# Patient Record
Sex: FEMALE | Race: WHITE | NOT HISPANIC OR LATINO | URBAN - METROPOLITAN AREA
[De-identification: names, ages, dates, MRNs, and addresses within clinical notes are randomized per-mention and may not be internally consistent; named-entity substitution may affect disease eponyms.]

---

## 2017-03-15 ENCOUNTER — ALLSCRIPTS OFFICE VISIT (OUTPATIENT)
Dept: OTHER | Facility: OTHER | Age: 62
End: 2017-03-15

## 2017-03-15 DIAGNOSIS — E55.9 VITAMIN D DEFICIENCY: ICD-10-CM

## 2017-03-15 DIAGNOSIS — F41.8 OTHER SPECIFIED ANXIETY DISORDERS: ICD-10-CM

## 2017-03-15 DIAGNOSIS — I10 ESSENTIAL (PRIMARY) HYPERTENSION: ICD-10-CM

## 2017-03-15 DIAGNOSIS — Z12.31 ENCOUNTER FOR SCREENING MAMMOGRAM FOR MALIGNANT NEOPLASM OF BREAST: ICD-10-CM

## 2017-03-23 ENCOUNTER — LAB CONVERSION - ENCOUNTER (OUTPATIENT)
Dept: OTHER | Facility: OTHER | Age: 62
End: 2017-03-23

## 2017-03-23 LAB
25(OH)D3 SERPL-MCNC: 23 NG/ML (ref 30–100)
A/G RATIO (HISTORICAL): 1.7 (CALC) (ref 1–2.5)
ALBUMIN SERPL BCP-MCNC: 4.3 G/DL (ref 3.6–5.1)
ALP SERPL-CCNC: 89 U/L (ref 33–130)
ALT SERPL W P-5'-P-CCNC: 14 U/L (ref 6–29)
AST SERPL W P-5'-P-CCNC: 17 U/L (ref 10–35)
BASOPHILS # BLD AUTO: 0.5 %
BASOPHILS # BLD AUTO: 21 CELLS/UL (ref 0–200)
BILIRUB SERPL-MCNC: 0.6 MG/DL (ref 0.2–1.2)
BILIRUBIN DIRECT (HISTORICAL): 0.1 MG/DL
BUN SERPL-MCNC: 15 MG/DL (ref 7–25)
BUN/CREA RATIO (HISTORICAL): NORMAL (CALC) (ref 6–22)
CALCIUM SERPL-MCNC: 9.9 MG/DL (ref 8.6–10.4)
CHLORIDE SERPL-SCNC: 107 MMOL/L (ref 98–110)
CHOLEST SERPL-MCNC: 230 MG/DL (ref 125–200)
CHOLEST/HDLC SERPL: 3.2 (CALC)
CO2 SERPL-SCNC: 28 MMOL/L (ref 20–31)
CREAT SERPL-MCNC: 0.78 MG/DL (ref 0.5–0.99)
DEPRECATED RDW RBC AUTO: 14.3 % (ref 11–15)
EGFR AFRICAN AMERICAN (HISTORICAL): 94 ML/MIN/1.73M2
EGFR-AMERICAN CALC (HISTORICAL): 81 ML/MIN/1.73M2
EOSINOPHIL # BLD AUTO: 1.1 %
EOSINOPHIL # BLD AUTO: 46 CELLS/UL (ref 15–500)
GAMMA GLOBULIN (HISTORICAL): 2.6 G/DL (CALC) (ref 1.9–3.7)
GLUCOSE (HISTORICAL): 85 MG/DL (ref 65–99)
HCT VFR BLD AUTO: 43 % (ref 35–45)
HDLC SERPL-MCNC: 73 MG/DL
HGB BLD-MCNC: 14 G/DL (ref 11.7–15.5)
INDIRECT BILIRUBIN (HISTORICAL): 0.5 MG/DL (CALC) (ref 0.2–1.2)
LDL CHOLESTEROL (HISTORICAL): 135 MG/DL (CALC)
LYMPHOCYTES # BLD AUTO: 1394 CELLS/UL (ref 850–3900)
LYMPHOCYTES # BLD AUTO: 33.2 %
MCH RBC QN AUTO: 30.6 PG (ref 27–33)
MCHC RBC AUTO-ENTMCNC: 32.5 G/DL (ref 32–36)
MCV RBC AUTO: 94.1 FL (ref 80–100)
MONOCYTES # BLD AUTO: 265 CELLS/UL (ref 200–950)
MONOCYTES (HISTORICAL): 6.3 %
NEUTROPHILS # BLD AUTO: 2474 CELLS/UL (ref 1500–7800)
NEUTROPHILS # BLD AUTO: 58.9 %
NON-HDL-CHOL (CHOL-HDL) (HISTORICAL): 157 MG/DL (CALC)
PLATELET # BLD AUTO: 269 THOUSAND/UL (ref 140–400)
PMV BLD AUTO: 9 FL (ref 7.5–12.5)
POTASSIUM SERPL-SCNC: 4.7 MMOL/L (ref 3.5–5.3)
RBC # BLD AUTO: 4.57 MILLION/UL (ref 3.8–5.1)
SODIUM SERPL-SCNC: 143 MMOL/L (ref 135–146)
TOTAL PROTEIN (HISTORICAL): 6.9 G/DL (ref 6.1–8.1)
TRIGL SERPL-MCNC: 110 MG/DL
TSH SERPL DL<=0.05 MIU/L-ACNC: 2.19 MIU/L (ref 0.4–4.5)
WBC # BLD AUTO: 4.2 THOUSAND/UL (ref 3.8–10.8)

## 2017-04-06 ENCOUNTER — ALLSCRIPTS OFFICE VISIT (OUTPATIENT)
Dept: OTHER | Facility: OTHER | Age: 62
End: 2017-04-06

## 2017-04-13 ENCOUNTER — GENERIC CONVERSION - ENCOUNTER (OUTPATIENT)
Dept: OTHER | Facility: OTHER | Age: 62
End: 2017-04-13

## 2017-10-01 DIAGNOSIS — I10 ESSENTIAL (PRIMARY) HYPERTENSION: ICD-10-CM

## 2017-10-01 DIAGNOSIS — E55.9 VITAMIN D DEFICIENCY: ICD-10-CM

## 2017-10-01 DIAGNOSIS — Z12.31 ENCOUNTER FOR SCREENING MAMMOGRAM FOR MALIGNANT NEOPLASM OF BREAST: ICD-10-CM

## 2017-10-01 DIAGNOSIS — E78.5 HYPERLIPIDEMIA: ICD-10-CM

## 2017-10-23 ENCOUNTER — GENERIC CONVERSION - ENCOUNTER (OUTPATIENT)
Dept: OTHER | Facility: OTHER | Age: 62
End: 2017-10-23

## 2017-10-30 ENCOUNTER — LAB CONVERSION - ENCOUNTER (OUTPATIENT)
Dept: OTHER | Facility: OTHER | Age: 62
End: 2017-10-30

## 2017-10-30 LAB
25(OH)D3 SERPL-MCNC: 39 NG/ML (ref 30–100)
A/G RATIO (HISTORICAL): 1.7 (CALC) (ref 1–2.5)
ALBUMIN SERPL BCP-MCNC: 4.2 G/DL (ref 3.6–5.1)
ALP SERPL-CCNC: 83 U/L (ref 33–130)
ALT SERPL W P-5'-P-CCNC: 13 U/L (ref 6–29)
AST SERPL W P-5'-P-CCNC: 19 U/L (ref 10–35)
BASOPHILS # BLD AUTO: 0.7 %
BASOPHILS # BLD AUTO: 30 CELLS/UL (ref 0–200)
BILIRUB SERPL-MCNC: 0.7 MG/DL (ref 0.2–1.2)
BILIRUBIN DIRECT (HISTORICAL): 0.1 MG/DL
BUN SERPL-MCNC: 16 MG/DL (ref 7–25)
BUN/CREA RATIO (HISTORICAL): NORMAL (CALC) (ref 6–22)
CALCIUM SERPL-MCNC: 9.4 MG/DL (ref 8.6–10.4)
CHLORIDE SERPL-SCNC: 104 MMOL/L (ref 98–110)
CO2 SERPL-SCNC: 28 MMOL/L (ref 20–31)
CREAT SERPL-MCNC: 0.74 MG/DL (ref 0.5–0.99)
DEPRECATED RDW RBC AUTO: 12.7 % (ref 11–15)
EGFR AFRICAN AMERICAN (HISTORICAL): 100 ML/MIN/1.73M2
EGFR-AMERICAN CALC (HISTORICAL): 86 ML/MIN/1.73M2
EOSINOPHIL # BLD AUTO: 0.9 %
EOSINOPHIL # BLD AUTO: 39 CELLS/UL (ref 15–500)
GAMMA GLOBULIN (HISTORICAL): 2.5 G/DL (CALC) (ref 1.9–3.7)
GLUCOSE (HISTORICAL): 82 MG/DL (ref 65–99)
HCT VFR BLD AUTO: 40.8 % (ref 35–45)
HGB BLD-MCNC: 14.1 G/DL (ref 11.7–15.5)
INDIRECT BILIRUBIN (HISTORICAL): 0.6 MG/DL (CALC) (ref 0.2–1.2)
LYMPHOCYTES # BLD AUTO: 1256 CELLS/UL (ref 850–3900)
LYMPHOCYTES # BLD AUTO: 29.2 %
MCH RBC QN AUTO: 31.2 PG (ref 27–33)
MCHC RBC AUTO-ENTMCNC: 34.6 G/DL (ref 32–36)
MCV RBC AUTO: 90.3 FL (ref 80–100)
MONOCYTES # BLD AUTO: 353 CELLS/UL (ref 200–950)
MONOCYTES (HISTORICAL): 8.2 %
NEUTROPHILS # BLD AUTO: 2623 CELLS/UL (ref 1500–7800)
NEUTROPHILS # BLD AUTO: 61 %
PLATELET # BLD AUTO: 266 THOUSAND/UL (ref 140–400)
PMV BLD AUTO: 10.5 FL (ref 7.5–12.5)
POTASSIUM SERPL-SCNC: 4.4 MMOL/L (ref 3.5–5.3)
RBC # BLD AUTO: 4.52 MILLION/UL (ref 3.8–5.1)
SODIUM SERPL-SCNC: 140 MMOL/L (ref 135–146)
TOTAL PROTEIN (HISTORICAL): 6.7 G/DL (ref 6.1–8.1)
WBC # BLD AUTO: 4.3 THOUSAND/UL (ref 3.8–10.8)

## 2017-11-02 ENCOUNTER — GENERIC CONVERSION - ENCOUNTER (OUTPATIENT)
Dept: OTHER | Facility: OTHER | Age: 62
End: 2017-11-02

## 2017-11-02 ENCOUNTER — ALLSCRIPTS OFFICE VISIT (OUTPATIENT)
Dept: OTHER | Facility: OTHER | Age: 62
End: 2017-11-02

## 2017-11-02 DIAGNOSIS — R07.9 CHEST PAIN: ICD-10-CM

## 2017-11-10 ENCOUNTER — GENERIC CONVERSION - ENCOUNTER (OUTPATIENT)
Dept: OTHER | Facility: OTHER | Age: 62
End: 2017-11-10

## 2017-11-20 ENCOUNTER — TRANSCRIBE ORDERS (OUTPATIENT)
Dept: BONE DENSITY | Facility: CLINIC | Age: 62
End: 2017-11-20

## 2017-11-20 ENCOUNTER — HOSPITAL ENCOUNTER (OUTPATIENT)
Dept: MAMMOGRAPHY | Facility: CLINIC | Age: 62
Discharge: HOME | End: 2017-11-20
Payer: COMMERCIAL

## 2017-11-20 DIAGNOSIS — Z12.31 ENCOUNTER FOR SCREENING MAMMOGRAM FOR MALIGNANT NEOPLASM OF BREAST: ICD-10-CM

## 2017-11-20 DIAGNOSIS — Z12.39 SCREENING BREAST EXAMINATION: Primary | ICD-10-CM

## 2017-11-20 PROCEDURE — G0202 SCR MAMMO BI INCL CAD: HCPCS

## 2017-11-20 PROCEDURE — 77063 BREAST TOMOSYNTHESIS BI: CPT

## 2017-12-05 ENCOUNTER — ALLSCRIPTS OFFICE VISIT (OUTPATIENT)
Dept: OTHER | Facility: OTHER | Age: 62
End: 2017-12-05

## 2017-12-05 DIAGNOSIS — M85.80 OTHER SPECIFIED DISORDERS OF BONE DENSITY AND STRUCTURE, UNSPECIFIED SITE (CODE): ICD-10-CM

## 2017-12-05 PROCEDURE — 87624 HPV HI-RISK TYP POOLED RSLT: CPT | Performed by: OBSTETRICS & GYNECOLOGY

## 2017-12-05 PROCEDURE — G0145 SCR C/V CYTO,THINLAYER,RESCR: HCPCS | Performed by: OBSTETRICS & GYNECOLOGY

## 2017-12-06 ENCOUNTER — LAB REQUISITION (OUTPATIENT)
Dept: LAB | Facility: HOSPITAL | Age: 62
End: 2017-12-06
Payer: COMMERCIAL

## 2017-12-06 DIAGNOSIS — Z01.411 ENCOUNTER FOR GYNECOLOGICAL EXAMINATION WITH ABNORMAL FINDING: ICD-10-CM

## 2017-12-06 NOTE — PROGRESS NOTES
Assessment    1  Encounter for gynecological examination with abnormal finding (V72 31) (Z01 411)    Plan  Benign colon polyp    · 1 Monica Anderson MD, [facility] Co-Management  *  Status: Active  Requestedfor: 53YGE6421   Ordered;Benign colon polyp; Ordered By: Rasta Michaud Performed:  Due: 85HYZ4135  Care Summary provided  : Yes  Dense breasts, Encounter for screening mammogram for malignant neoplasm of breast    · MAMMO SCREENING BILATERAL W 3D & CAD; Status:Hold For - Scheduling; Requestedfor:30Nov2018; Perform:[facility]; LJQ:[id number];NSDTKSS;QYJMEBV, Encounter for screening mammogram for malignant neoplasm of breast; Ordered By:Christopher Leblanc;  Encounter for gynecological examination with abnormal finding    · Follow-up visit in 1 year Evaluation and Treatment  Follow-up  Status: Hold For -Scheduling  Requested for: 12SJR4216   Ordered; For: Encounter for gynecological examination with abnormal finding; Ordered By: Rasta Michaud Performed:  Due: 25MSY8778   · Drink plenty of fluids ; Status:Complete;   Done: 79VKX7999 01:58PM   Ordered;for gynecological examination with abnormal finding; Ordered By:Christopher Leblanc;   · Vitamins can help you get daily requirements that your diet may not be giving you  ;Status:Complete;   Done: 92BJO4959 01:58PM   Ordered;for gynecological examination with abnormal finding; Ordered By:Christopher Leblanc;   · We encourage all of our patients to exercise regularly  30 minutes of exercise or physicalactivity five or more days a week is recommended for children and adults  ;Status:Complete;   Done: 97MGU4733 01:58PM   Ordered;for gynecological examination with abnormal finding; Ordered By:Christopher Leblanc;   · We recommend that you follow these steps to lower your risk of osteoporosis  ;Status:Complete;   Done: 58TBE1826 01:58PM   Ordered;for gynecological examination with abnormal finding; Ordered By:Deana Carmelita Muhammad;  Osteopenia    · * DXA BONE DENSITY SPINE HIP AND PELVIS; Status:Hold For -Scheduling,Retrospective By Protocol Authorization; Requested for:[id number];    Perform:[facility]; [phone]; Last Updated By:Tracy, Melodye Severin; 12/5/2017 1:30:31 PM;Ordered;Ordered Avi Salinas, Carmelita Muhammad;    Discussion/Summary  healthy adult female HPV and Pap Co-testing Done Today Breast cancer screening: mammogram is needed every year  Colorectal cancer screening: colonoscopy has been ordered  The patient has the current Goals: Healthy lifestyle  The patent has the current Barriers: Motivation  Patient is able to Self-Care  PATIENT EDUCATION RECORD She was given the following educational materials:  age appropriate care guide  The treatment plan was reviewed with the patient/guardian  The patient/guardian understands and agrees with the treatment plan     Self Referrals: No      Chief Complaint  Patient here for an exam today  History of Present Illness  GYN HM, Adult Female [facility]: The patient is being seen for a gynecology evaluation  The last health maintenance visit was 2 year(s) ago  Social History: Household members include lives with sister  General Health: The patient's health since the last visit is described as good  Lifestyle:  She exercises regularly  -- She does not use tobacco -- She consumes alcohol  Reproductive health: the patient is postmenopausal--   pregnancy history: G 1P 0  Screening: Cervical cancer screening includes a pap smear performed 9/24/14, neg-- and-- human papilloma virus screening performed 7/13/12, neg  Breast cancer screening includes a mammogram performed 11/30/17  BR2  Colorectal cancer screening includes a colonoscopy performed 10/14/14  Metabolic screening includes uncertain timing of her last DEXA        Review of Systems  urinary loss of control, but-- no pelvic pain,-- no pelvic pressure,-- no vaginal pain,-- no vaginal discharge,-- no vaginal itching,-- no vaginal lump or mass,-- no vaginal odor,-- no nonmenstrual bleeding-- and-- no dysuria  Constitutional: No fever, no chills, feels well, no tiredness, no recent weight gain or loss  ENT: no ear ache, no loss of hearing, no nosebleeds or nasal discharge, no sore throat or hoarseness  Cardiovascular: no complaints of slow or fast heart rate, no chest pain, no palpitations, no leg claudication or lower extremity edema  Respiratory: no complaints of shortness of breath, no wheezing, no dyspnea on exertion, no orthopnea or PND  Breasts: no complaints of breast pain, breast lump or nipple discharge  Gastrointestinal: no complaints of abdominal pain, no constipation, no nausea or diarrhea, no vomiting, no bloody stools  Genitourinary: no complaints of dysuria, no incontinence, no pelvic pain, no dysmenorrhea, no vaginal discharge or abnormal vaginal bleeding  Musculoskeletal: no complaints of arthralgia, no myalgia, no joint swelling or stiffness, no limb pain or swelling  Integumentary: no complaints of skin rash or lesion, no itching or dry skin, no skin wounds  Neurological: no complaints of headache, no confusion, no numbness or tingling, no dizziness or fainting  Active Problems    1  Benign colon polyp (211 3) (K63 5)   2  Chest pain (786 50) (R07 9)   3  Dense breasts (793 82) (R92 2)   4  Depression with anxiety (300 4) (F41 8)   5  Encounter for screening mammogram for malignant neoplasm of breast (V76 12) (Z12 31)   6  Essential hypertension (401 9) (I10)   7  Hyperlipidemia (272 4) (E78 5)   8  Insomnia (780 52) (G47 00)   9  Screening for colon cancer (V76 51) (Z12 11)   10  Screening mammogram, encounter for (V76 12) (Z12 31)   11  Seborrheic keratosis (702 19) (L82 1)   12  Skin lesion (709 9) (L98 9)   13   Vitamin D deficiency (268 9) (E55 9)    Surgical History     · History of Bunion Correct Osteotomy Flood Double-Stem Lesser MTP Impl   · History of Corneal LASIK   · History of Exploratory Laparotomy   · History of Knee Surgery   · History of Shoulder Surgery    Family History  Mother    · Family history of Cancer of unknown origin   · Family history of CVA (cerebrovascular accident due to intracerebral hemorrhage)   · Family history of coronary artery disease (V17 3) (Z82 49)   · Family history of dementia (V17 2) (Z81 8)  Father    · Family history of dementia (V17 2) (Z81 8)    Social History     · Consumes alcohol (V49 89) (Z78 9)   · Never a smoker   · Retired    Current Meds   1  Restasis 0 05 % Ophthalmic Emulsion; INSTILL 1 DROP IN [facility] EYE TWICE DAILY; Therapy: 41WUL7835 to Recorded   2  Venlafaxine HCl  MG Oral Capsule Extended Release 24 Hour; take 1 capsule by mouth daily; Therapy: 39JRH4937 to (Urbano Smith)  Requested for: 27UFQ3439; Last Rx:05Nov2017 Ordered    Allergies  1  Keflex TABS    Vitals   Recorded: 93ITY3482 37:99PF   Systolic 383   Diastolic 70   Height 5 ft    Weight 140 lb    BMI Calculated 27 34   BSA Calculated 1 6   LMP        Physical Exam   Constitutional  General appearance: No acute distress, well appearing and well nourished  Genitourinary  External genitalia: Normal and no lesions appreciated  Vagina: Normal, no lesions or dryness appreciated  Urethra: Normal    Urethral meatus: Normal    Bladder: Normal, soft, non-tender and no prolapse or masses appreciated  Cervix: Normal, no palpable masses  Uterus: Normal, non-tender, not enlarged, and no palpable masses  Adnexa/parametria: Normal, non-tender and no fullness or masses appreciated  Chest  Breasts: Normal and no dimpling or skin changes noted  Abdomen  Abdomen: Normal, non-tender, and no organomegaly noted  Future Appointments    Date/Time Provider Specialty Site   03/13/2018 01:00 PM YESSENIA Hawk  Internal Medicine [facility]   01/25/2018 01:00 PM YESSENIA Villeda   Dermatology [facility] Signatures   Electronically signed by : Marysol Nj MD; Dec  5 2017  1:59PM EST                       (Author)

## 2017-12-12 LAB — HPV RRNA GENITAL QL NAA+PROBE: NORMAL

## 2017-12-13 LAB
LAB AP GYN PRIMARY INTERPRETATION: NORMAL
Lab: NORMAL

## 2018-01-13 VITALS
HEART RATE: 97 BPM | WEIGHT: 136.38 LBS | HEIGHT: 62 IN | SYSTOLIC BLOOD PRESSURE: 138 MMHG | BODY MASS INDEX: 25.1 KG/M2 | DIASTOLIC BLOOD PRESSURE: 92 MMHG

## 2018-01-13 VITALS
BODY MASS INDEX: 24.56 KG/M2 | SYSTOLIC BLOOD PRESSURE: 124 MMHG | WEIGHT: 133.5 LBS | RESPIRATION RATE: 12 BRPM | HEIGHT: 62 IN | HEART RATE: 68 BPM | DIASTOLIC BLOOD PRESSURE: 80 MMHG

## 2018-01-22 VITALS
HEIGHT: 62 IN | WEIGHT: 139.38 LBS | BODY MASS INDEX: 25.65 KG/M2 | RESPIRATION RATE: 14 BRPM | DIASTOLIC BLOOD PRESSURE: 76 MMHG | HEART RATE: 80 BPM | SYSTOLIC BLOOD PRESSURE: 110 MMHG

## 2018-01-23 VITALS
WEIGHT: 140 LBS | BODY MASS INDEX: 27.48 KG/M2 | HEIGHT: 60 IN | SYSTOLIC BLOOD PRESSURE: 116 MMHG | DIASTOLIC BLOOD PRESSURE: 70 MMHG

## 2018-01-25 ENCOUNTER — OFFICE VISIT (OUTPATIENT)
Dept: DERMATOLOGY | Facility: CLINIC | Age: 63
End: 2018-01-25
Payer: COMMERCIAL

## 2018-01-25 DIAGNOSIS — L29.9 PRURITUS OF SCALP: Primary | ICD-10-CM

## 2018-01-25 DIAGNOSIS — Z13.89 SCREENING FOR SKIN CONDITION: ICD-10-CM

## 2018-01-25 DIAGNOSIS — L72.11 PILAR CYST: ICD-10-CM

## 2018-01-25 DIAGNOSIS — L82.1 SEBORRHEIC KERATOSIS: ICD-10-CM

## 2018-01-25 PROCEDURE — 99203 OFFICE O/P NEW LOW 30 MIN: CPT | Performed by: DERMATOLOGY

## 2018-01-25 RX ORDER — CYCLOSPORINE 0.5 MG/ML
1 EMULSION OPHTHALMIC 2 TIMES DAILY
COMMUNITY
Start: 2017-03-15

## 2018-01-25 RX ORDER — CETIRIZINE HYDROCHLORIDE 10 MG/1
10 TABLET ORAL DAILY
Qty: 30 TABLET | Refills: 0 | Status: SHIPPED | OUTPATIENT
Start: 2018-01-25

## 2018-01-25 RX ORDER — VENLAFAXINE HYDROCHLORIDE 150 MG/1
150 CAPSULE, EXTENDED RELEASE ORAL DAILY
COMMUNITY
Start: 2017-03-15 | End: 2018-05-25 | Stop reason: SDUPTHER

## 2018-01-25 NOTE — PROGRESS NOTES
[address] [facility] OF [address] [facility] DERMATOLOGY  [address] [facility] [address]     MRN: [id number] : 1955  Encounter: [id number]  Patient Information: Lisy Karimi  Chief complaint:  New growth under left eyebrow and overall skin check    History of present illness:  27-year-old female presents because of concerns regarding a growth that she noted a left eyebrow recently she feels it is getting larger and is annoying her also concerned regarding potential skin cancer with numerous growths on her skin  Patient also complaining of itching on her scalp she has tried dandruff shampoo without any improvement  No past medical history on file  No past surgical history on file  Social History   History   Alcohol use Not on file     History   Drug use: Unknown     History   Smoking Status    Not on file   Smokeless Tobacco    Not on file     No family history on file  Meds/Allergies   Allergies   Allergen Reactions    Keflex [Cephalexin] Hives       Meds:  Prior to Admission medications    Medication Sig Start Date End Date Taking?  Authorizing Provider   cycloSPORINE (RESTASIS) 0 05 % ophthalmic emulsion Apply 1 drop to eye 2 (two) times a day 3/15/17  Yes Historical Provider, MD   venlafaxine (EFFEXOR-XR) 150 mg 24 hr capsule Take 150 mg by mouth daily 3/15/17  Yes Historical Provider, MD   cetirizine (ZyrTEC) 10 mg tablet Take 1 tablet by mouth daily 18   Armani Trammell MD       Subjective:     Review of Systems:    General: negative for - chills, fatigue, fever,  weight gain or weight loss  Psychological: negative for - anxiety, behavioral disorder, concentration difficulties, decreased libido, depression, irritability, memory difficulties, mood swings, sleep disturbances or suicidal ideation  ENT: negative for - hearing difficulties , nasal congestion, nasal discharge, oral lesions, sinus pain, sneezing, sore throat  Allergy and Immunology: negative for - hives, insect bite sensitivity,  Hematological and Lymphatic: negative for - bleeding problems, blood clots,bruising, swollen lymph nodes  Endocrine: negative for - hair pattern changes, hot flashes, malaise/lethargy, mood swings, palpitations, polydipsia/polyuria, skin changes, temperature intolerance or unexpected weight change  Respiratory: negative for - cough, hemoptysis, orthopnea, shortness of breath, or wheezing  Cardiovascular: negative for - chest pain, dyspnea on exertion, edema,  Gastrointestinal: negative for - abdominal pain, nausea/vomiting  Genito-Urinary: negative for - dysuria, incontinence, irregular/heavy menses or urinary frequency/urgency  Musculoskeletal: negative for - gait disturbance, joint pain, joint stiffness, joint swelling, muscle pain, muscular weakness  Dermatological:  As in HPI  Neurological: negative for confusion, dizziness, headaches, impaired coordination/balance, memory loss, numbness/tingling, seizures, speech problems, tremors or weakness       Objective: There were no vitals taken for this visit  Physical Exam:    General Appearance:    Alert, cooperative, no distress   Head:    Normocephalic, without obvious abnormality, atraumatic   Lymphatics:    No lymphadenopathy noted      Abdomen:   No hepatosplenomegaly   Skin:   A full skin exam was performed including scalp, head scalp, eyes, ears, nose, lips, neck, chest, axilla, abdomen, back, buttocks, bilateral upper extremities, bilateral lower extremities, hands, feet, fingers, toes, fingernails, and toenails 3mm dermal papule L eyebrow normal keratotic papules with greasy stuck on appearance no definitive rash noted on the scalp no scaling or erythema negative dermatographia  Nothing else of concern noted on complete exam     Assessment:     1  Pruritus of scalp  cetirizine (ZyrTEC) 10 mg tablet   2  Seborrheic keratosis     3  Pilar cyst     4  Screening for skin condition           Plan:   1    Pruritus of the scalp appears to be more related to the dry cold weather we suggested use of moisturizes mild soaps avoidance of irritants and a trial of Zyrtec  2  Seborrheic keratosis patient reassured these are normal growths we acquire with age no treatment needed  3   Pilar cyst because the patient's concern and irritation will plan on excision in the near future  4   Nothing else of concern noted on complete exam follow-up probably yearly    Makayla Mayo MD  1/25/2018,1:48 PM    Portions of the record may have been created with voice recognition software   Occasional wrong word or "sound a like" substitutions may have occurred due to the inherent limitations of voice recognition software   Read the chart carefully and recognize, using context, where substitutions have occurred

## 2018-03-01 DIAGNOSIS — E78.5 HYPERLIPIDEMIA: ICD-10-CM

## 2018-03-08 LAB
ALBUMIN SERPL-MCNC: 4.5 G/DL (ref 3.6–5.1)
ALBUMIN/GLOB SERPL: 1.7 (CALC) (ref 1–2.5)
ALP SERPL-CCNC: 96 U/L (ref 33–130)
ALT SERPL-CCNC: 13 U/L (ref 6–29)
AST SERPL-CCNC: 17 U/L (ref 10–35)
BASOPHILS # BLD AUTO: 29 CELLS/UL (ref 0–200)
BASOPHILS NFR BLD AUTO: 0.6 %
BILIRUB SERPL-MCNC: 0.4 MG/DL (ref 0.2–1.2)
BUN SERPL-MCNC: 17 MG/DL (ref 7–25)
BUN/CREAT SERPL: NORMAL (CALC) (ref 6–22)
CALCIUM SERPL-MCNC: 9.4 MG/DL (ref 8.6–10.4)
CHLORIDE SERPL-SCNC: 104 MMOL/L (ref 98–110)
CHOLEST SERPL-MCNC: 229 MG/DL
CHOLEST/HDLC SERPL: 3.3 (CALC)
CO2 SERPL-SCNC: 27 MMOL/L (ref 20–31)
CREAT SERPL-MCNC: 0.84 MG/DL (ref 0.5–0.99)
EOSINOPHIL # BLD AUTO: 49 CELLS/UL (ref 15–500)
EOSINOPHIL NFR BLD AUTO: 1 %
ERYTHROCYTE [DISTWIDTH] IN BLOOD BY AUTOMATED COUNT: 12.5 % (ref 11–15)
GLOBULIN SER CALC-MCNC: 2.6 G/DL (CALC) (ref 1.9–3.7)
GLUCOSE SERPL-MCNC: 90 MG/DL (ref 65–99)
HCT VFR BLD AUTO: 42.1 % (ref 35–45)
HDLC SERPL-MCNC: 70 MG/DL
HGB BLD-MCNC: 14.3 G/DL (ref 11.7–15.5)
LDLC SERPL CALC-MCNC: 142 MG/DL (CALC)
LYMPHOCYTES # BLD AUTO: 1485 CELLS/UL (ref 850–3900)
LYMPHOCYTES NFR BLD AUTO: 30.3 %
MCH RBC QN AUTO: 31.2 PG (ref 27–33)
MCHC RBC AUTO-ENTMCNC: 34 G/DL (ref 32–36)
MCV RBC AUTO: 91.7 FL (ref 80–100)
MONOCYTES # BLD AUTO: 431 CELLS/UL (ref 200–950)
MONOCYTES NFR BLD AUTO: 8.8 %
NEUTROPHILS # BLD AUTO: 2906 CELLS/UL (ref 1500–7800)
NEUTROPHILS NFR BLD AUTO: 59.3 %
NONHDLC SERPL-MCNC: 159 MG/DL (CALC)
PLATELET # BLD AUTO: 352 THOUSAND/UL (ref 140–400)
PMV BLD REES-ECKER: 10.5 FL (ref 7.5–12.5)
POTASSIUM SERPL-SCNC: 4.5 MMOL/L (ref 3.5–5.3)
PROT SERPL-MCNC: 7.1 G/DL (ref 6.1–8.1)
RBC # BLD AUTO: 4.59 MILLION/UL (ref 3.8–5.1)
SL AMB EGFR AFRICAN AMERICAN: 86 ML/MIN/1.73M2
SL AMB EGFR NON AFRICAN AMERICAN: 74 ML/MIN/1.73M2
SODIUM SERPL-SCNC: 141 MMOL/L (ref 135–146)
TRIGL SERPL-MCNC: 71 MG/DL
TSH SERPL-ACNC: 1.4 MIU/L (ref 0.4–4.5)
WBC # BLD AUTO: 4.9 THOUSAND/UL (ref 3.8–10.8)

## 2018-03-13 ENCOUNTER — OFFICE VISIT (OUTPATIENT)
Dept: INTERNAL MEDICINE CLINIC | Facility: CLINIC | Age: 63
End: 2018-03-13
Payer: COMMERCIAL

## 2018-03-13 VITALS
BODY MASS INDEX: 26.13 KG/M2 | RESPIRATION RATE: 12 BRPM | SYSTOLIC BLOOD PRESSURE: 124 MMHG | HEIGHT: 61 IN | WEIGHT: 138.4 LBS | HEART RATE: 72 BPM | DIASTOLIC BLOOD PRESSURE: 80 MMHG

## 2018-03-13 DIAGNOSIS — M85.80 OSTEOPENIA, UNSPECIFIED LOCATION: ICD-10-CM

## 2018-03-13 DIAGNOSIS — E55.9 VITAMIN D DEFICIENCY: Primary | ICD-10-CM

## 2018-03-13 DIAGNOSIS — E78.2 MIXED HYPERLIPIDEMIA: ICD-10-CM

## 2018-03-13 DIAGNOSIS — K63.5 BENIGN COLON POLYP: ICD-10-CM

## 2018-03-13 DIAGNOSIS — F41.8 DEPRESSION WITH ANXIETY: ICD-10-CM

## 2018-03-13 PROBLEM — E78.5 HYPERLIPIDEMIA: Status: ACTIVE | Noted: 2017-04-06

## 2018-03-13 PROBLEM — I10 ESSENTIAL HYPERTENSION: Status: ACTIVE | Noted: 2017-03-15

## 2018-03-13 PROCEDURE — 99214 OFFICE O/P EST MOD 30 MIN: CPT | Performed by: INTERNAL MEDICINE

## 2018-03-13 RX ORDER — MULTIVIT-MIN/IRON/FOLIC ACID/K 18-600-40
CAPSULE ORAL
COMMUNITY

## 2018-03-13 NOTE — PROGRESS NOTES
Assessment/Plan:    No problem-specific Assessment & Plan notes found for this encounter  Diagnoses and all orders for this visit:    Vitamin D deficiency  -     Vitamin D 25 hydroxy; Future    Mixed hyperlipidemia  -     CBC; Future  -     Comprehensive metabolic panel; Future  -     Lipid panel; Future    Depression with anxiety    Osteopenia, unspecified location    Benign colon polyp    Other orders  -     Cholecalciferol (VITAMIN D) 2000 units CAPS; Take by mouth        Patient Instructions     Lab data reviewed in detail and compared prior     Hyperlipidemia -10 year atherosclerotic risk is calculated at 4 1%  Implications discussed, this does not account for family history  Patient would like to avoid statin treatment  At this point I have recommended increased aerobic exercise with a goal of 30 minutes per day, 5 days per week, low-cholesterol diet /Mediterranean diet recommended  Recheck in 6 months    Blood pressure remained stable without medication    Depression with anxiety is stable on venlafaxine we discussed weaning by going 150 mg alternating with 75 mg every other day for 1-2 weeks and then 75 mg daily for 1-2 weeks and then alternate with 37 5 and 75 for 1-2 weeks before dropping to 37 5 alternating with nothing for 1-2 weeks prior to discontinuation  Contact me for the prescription for 37 5 mg tablets when you get to that point  Notably patient does have 75 mg tablets at home, check to make sure they are not   Vitamin-D deficiency-continue on 2000 international units daily and recheck vitamin-D levels prior to follow-up     Osteopenia -get a bone density done    History of colon polyps -due for repeat colonoscopy in 2019     Routine follow-up after labs in 6 months, sooner as needed  Subjective:      Patient ID: Rakan Hernandez is a 60 y o  female      Patient presents for routine follow-up     She has not had any further chest pain since evaluated in November, she had normal Get stress test where she went 9 minutes without chest pain shortness of breath or EKG or echocardiographic changes  Chest x-ray at that time was also normal, unfortunately these results got filed in the chart without coming directly to me  Patient was not notified of the results  She had been exercising fairly regularly with treadmill for 30 minutes 3 times per week but she stopped a few weeks back but plans to get back into it  She had her mammogram and well-woman exam, but she has not yet scheduled bone density or colonoscopy  I reviewed her last colonoscopy from October of 2014 where there was diverticulosis but no polyps removed, she does have history of polyps in the past     Depression/Anxiety have been stable on 150 mg daily, wants to try to wean off in April  The following portions of the patient's history were reviewed and updated as appropriate: allergies, current medications, past family history, past medical history, past social history, past surgical history and problem list     Review of Systems   Constitutional: Negative for appetite change, chills, diaphoresis, fatigue, fever and unexpected weight change  HENT: Negative for congestion, hearing loss and rhinorrhea  Eyes: Negative for visual disturbance  Respiratory: Negative for cough, chest tightness, shortness of breath and wheezing  Cardiovascular: Negative for chest pain, palpitations and leg swelling  Gastrointestinal: Negative for abdominal pain and blood in stool  Endocrine: Negative for cold intolerance, heat intolerance, polydipsia and polyuria  Genitourinary: Negative for difficulty urinating, dysuria, frequency and urgency  Musculoskeletal: Negative for arthralgias and myalgias  Skin: Negative for rash  Neurological: Negative for dizziness, weakness, light-headedness and headaches  Hematological: Does not bruise/bleed easily     Psychiatric/Behavioral: Negative for dysphoric mood and sleep disturbance  Objective:      /80 (BP Location: Left arm, Patient Position: Sitting)   Pulse 72   Resp 12   Ht 5' 1" (1 549 m)   Wt 62 8 kg (138 lb 6 4 oz)   BMI 26 15 kg/m²          Physical Exam   Constitutional: She is oriented to person, place, and time  She appears well-developed and well-nourished  HENT:   Head: Normocephalic and atraumatic  Nose: Nose normal    Mouth/Throat: Oropharynx is clear and moist    Eyes: Conjunctivae and EOM are normal  Pupils are equal, round, and reactive to light  No scleral icterus  Neck: Normal range of motion  Neck supple  No JVD present  No tracheal deviation present  No thyromegaly present  Cardiovascular: Normal rate, regular rhythm and intact distal pulses  Exam reveals no gallop and no friction rub  No murmur heard  Pulmonary/Chest: Effort normal and breath sounds normal  No respiratory distress  She has no wheezes  She has no rales  Abdominal: Soft  Bowel sounds are normal  She exhibits no distension and no mass  There is no tenderness  There is no rebound and no guarding  Musculoskeletal: She exhibits no edema or tenderness  Lymphadenopathy:     She has no cervical adenopathy  Neurological: She is alert and oriented to person, place, and time  No cranial nerve deficit  Skin: Skin is warm and dry  No rash noted  No erythema  Psychiatric: She has a normal mood and affect   Her behavior is normal  Judgment and thought content normal

## 2018-03-13 NOTE — PATIENT INSTRUCTIONS
Lab data reviewed in detail and compared prior     Hyperlipidemia -10 year atherosclerotic risk is calculated at 4 1%  Implications discussed, this does not account for family history  Patient would like to avoid statin treatment  At this point I have recommended increased aerobic exercise with a goal of 30 minutes per day, 5 days per week, low-cholesterol diet /Mediterranean diet recommended  Recheck in 6 months    Blood pressure remained stable without medication    Depression with anxiety is stable on venlafaxine we discussed weaning by going 150 mg alternating with 75 mg every other day for 1-2 weeks and then 75 mg daily for 1-2 weeks and then alternate with 37 5 and 75 for 1-2 weeks before dropping to 37 5 alternating with nothing for 1-2 weeks prior to discontinuation  Contact me for the prescription for 37 5 mg tablets when you get to that point  Notably patient does have 75 mg tablets at home, check to make sure they are not   Vitamin-D deficiency-continue on 2000 international units daily and recheck vitamin-D levels prior to follow-up     Osteopenia -get a bone density done    History of colon polyps -due for repeat colonoscopy in 2019     Routine follow-up after labs in 6 months, sooner as needed

## 2018-05-25 DIAGNOSIS — F41.9 ANXIETY: Primary | ICD-10-CM

## 2018-05-25 RX ORDER — VENLAFAXINE HYDROCHLORIDE 150 MG/1
150 CAPSULE, EXTENDED RELEASE ORAL DAILY
Qty: 30 CAPSULE | Refills: 0 | Status: SHIPPED | OUTPATIENT
Start: 2018-05-25 | End: 2018-05-29 | Stop reason: SDUPTHER

## 2018-05-25 NOTE — TELEPHONE ENCOUNTER
Patient went to the pharmacy requesting a refill on her:    VENLAFAXINE 75 mg tablet ? The pharmacy said that they sent several refill request     Please advise        [facility]

## 2018-05-29 ENCOUNTER — TELEPHONE (OUTPATIENT)
Dept: INTERNAL MEDICINE CLINIC | Facility: CLINIC | Age: 63
End: 2018-05-29

## 2018-05-29 DIAGNOSIS — F41.9 ANXIETY: ICD-10-CM

## 2018-05-29 RX ORDER — VENLAFAXINE HYDROCHLORIDE 75 MG/1
75 CAPSULE, EXTENDED RELEASE ORAL DAILY
Qty: 30 CAPSULE | Refills: 1 | Status: SHIPPED | OUTPATIENT
Start: 2018-05-29 | End: 2018-07-29 | Stop reason: SDUPTHER

## 2018-05-29 NOTE — TELEPHONE ENCOUNTER
Answering service 5/26/2018 6:06 pm   She only has 1 pill left for tomorrow, but the wrong medication strength was sent to the pharmacy   Pt is concerned if she runs out and the side effects she can have for suddenly stopping it  [facility] is closing for the day if it can be correctly called in on their voicemail  [facility]

## 2018-07-29 DIAGNOSIS — F41.9 ANXIETY: ICD-10-CM

## 2018-07-29 RX ORDER — VENLAFAXINE HYDROCHLORIDE 75 MG/1
CAPSULE, EXTENDED RELEASE ORAL
Qty: 30 CAPSULE | Refills: 1 | Status: SHIPPED | OUTPATIENT
Start: 2018-07-29 | End: 2018-09-20 | Stop reason: SDUPTHER

## 2018-09-14 LAB
25(OH)D3 SERPL-MCNC: 42 NG/ML (ref 30–100)
ALBUMIN SERPL-MCNC: 4.4 G/DL (ref 3.6–5.1)
ALBUMIN/GLOB SERPL: 1.7 (CALC) (ref 1–2.5)
ALP SERPL-CCNC: 83 U/L (ref 33–130)
ALT SERPL-CCNC: 13 U/L (ref 6–29)
AST SERPL-CCNC: 16 U/L (ref 10–35)
BASOPHILS # BLD AUTO: 41 CELLS/UL (ref 0–200)
BASOPHILS NFR BLD AUTO: 1 %
BILIRUB SERPL-MCNC: 0.5 MG/DL (ref 0.2–1.2)
BUN SERPL-MCNC: 21 MG/DL (ref 7–25)
BUN/CREAT SERPL: NORMAL (CALC) (ref 6–22)
CALCIUM SERPL-MCNC: 9.6 MG/DL (ref 8.6–10.4)
CHLORIDE SERPL-SCNC: 105 MMOL/L (ref 98–110)
CHOLEST SERPL-MCNC: 241 MG/DL
CHOLEST/HDLC SERPL: 3.1 (CALC)
CO2 SERPL-SCNC: 31 MMOL/L (ref 20–32)
CREAT SERPL-MCNC: 0.87 MG/DL (ref 0.5–0.99)
EOSINOPHIL # BLD AUTO: 57 CELLS/UL (ref 15–500)
EOSINOPHIL NFR BLD AUTO: 1.4 %
ERYTHROCYTE [DISTWIDTH] IN BLOOD BY AUTOMATED COUNT: 12.9 % (ref 11–15)
GLOBULIN SER CALC-MCNC: 2.6 G/DL (CALC) (ref 1.9–3.7)
GLUCOSE SERPL-MCNC: 91 MG/DL (ref 65–99)
HCT VFR BLD AUTO: 42.3 % (ref 35–45)
HDLC SERPL-MCNC: 77 MG/DL
HGB BLD-MCNC: 14.2 G/DL (ref 11.7–15.5)
LDLC SERPL CALC-MCNC: 145 MG/DL (CALC)
LYMPHOCYTES # BLD AUTO: 1415 CELLS/UL (ref 850–3900)
LYMPHOCYTES NFR BLD AUTO: 34.5 %
MCH RBC QN AUTO: 31.8 PG (ref 27–33)
MCHC RBC AUTO-ENTMCNC: 33.6 G/DL (ref 32–36)
MCV RBC AUTO: 94.6 FL (ref 80–100)
MONOCYTES # BLD AUTO: 357 CELLS/UL (ref 200–950)
MONOCYTES NFR BLD AUTO: 8.7 %
NEUTROPHILS # BLD AUTO: 2230 CELLS/UL (ref 1500–7800)
NEUTROPHILS NFR BLD AUTO: 54.4 %
NONHDLC SERPL-MCNC: 164 MG/DL (CALC)
PLATELET # BLD AUTO: 311 THOUSAND/UL (ref 140–400)
PMV BLD REES-ECKER: 10.2 FL (ref 7.5–12.5)
POTASSIUM SERPL-SCNC: 4.7 MMOL/L (ref 3.5–5.3)
PROT SERPL-MCNC: 7 G/DL (ref 6.1–8.1)
RBC # BLD AUTO: 4.47 MILLION/UL (ref 3.8–5.1)
SL AMB EGFR AFRICAN AMERICAN: 82 ML/MIN/1.73M2
SL AMB EGFR NON AFRICAN AMERICAN: 71 ML/MIN/1.73M2
SODIUM SERPL-SCNC: 141 MMOL/L (ref 135–146)
TRIGL SERPL-MCNC: 89 MG/DL
WBC # BLD AUTO: 4.1 THOUSAND/UL (ref 3.8–10.8)

## 2018-09-20 ENCOUNTER — OFFICE VISIT (OUTPATIENT)
Dept: INTERNAL MEDICINE CLINIC | Facility: CLINIC | Age: 63
End: 2018-09-20
Payer: COMMERCIAL

## 2018-09-20 VITALS
HEIGHT: 61 IN | DIASTOLIC BLOOD PRESSURE: 100 MMHG | RESPIRATION RATE: 14 BRPM | BODY MASS INDEX: 26.77 KG/M2 | WEIGHT: 141.8 LBS | HEART RATE: 89 BPM | SYSTOLIC BLOOD PRESSURE: 140 MMHG

## 2018-09-20 DIAGNOSIS — I10 ESSENTIAL HYPERTENSION: ICD-10-CM

## 2018-09-20 DIAGNOSIS — E78.2 MIXED HYPERLIPIDEMIA: ICD-10-CM

## 2018-09-20 DIAGNOSIS — Z11.59 NEED FOR HEPATITIS C SCREENING TEST: ICD-10-CM

## 2018-09-20 DIAGNOSIS — K63.5 BENIGN COLON POLYP: ICD-10-CM

## 2018-09-20 DIAGNOSIS — Z78.0 POST-MENOPAUSAL: ICD-10-CM

## 2018-09-20 DIAGNOSIS — F41.9 ANXIETY: ICD-10-CM

## 2018-09-20 DIAGNOSIS — Z12.39 SCREENING FOR BREAST CANCER: Primary | ICD-10-CM

## 2018-09-20 PROCEDURE — 3008F BODY MASS INDEX DOCD: CPT | Performed by: NURSE PRACTITIONER

## 2018-09-20 PROCEDURE — 99214 OFFICE O/P EST MOD 30 MIN: CPT | Performed by: NURSE PRACTITIONER

## 2018-09-20 RX ORDER — VENLAFAXINE HYDROCHLORIDE 75 MG/1
75 CAPSULE, EXTENDED RELEASE ORAL DAILY
Qty: 30 CAPSULE | Refills: 0 | Status: SHIPPED | OUTPATIENT
Start: 2018-09-20 | End: 2018-11-27 | Stop reason: SDUPTHER

## 2018-09-20 NOTE — PROGRESS NOTES
Assessment/Plan:    Patient Instructions   Anxiety:  Patient to continue on 75 mg of Effexor XR daily  Her anxiety is well controlled on this dosage  She may consider in the future decreasing this dose but would like to wait until after the holidays  Essential hypertension:   Recheck blood pressure is 140/100  Patient states that she does tend to have higher blood pressure in the doctor's office  She has a home blood pressure monitor and will monitor her blood pressure over the next few weeks  She will contact the office with her results  Discussion regarding increasing exercise and decreasing sodium in her diet  Hyperlipidemia:  Labs reviewed in detail  Lengthy discussion regarding arthrosclerosis risks  Patient prefers at this time to control cholesterol with diet and exercise and does not want to start statins, which is not absolutely indicated at this time  Will recheck labs in 6 months  Health maintenance:  Patient is due for colonoscopy next October 2019  Patient is up-to-date on mammogram and Pap smear  DEXA scan ordered  Hepatitis C screening ordered  Patient has already received flu vaccine for this season  Pt moving to [place] but will still continue to follow with us         Diagnoses and all orders for this visit:    Screening for breast cancer  -     Mammo screening bilateral w 3d & cad; Future    Anxiety  -     venlafaxine (EFFEXOR-XR) 75 mg 24 hr capsule; Take 1 capsule (75 mg total) by mouth daily    Post-menopausal  -     DXA bone density spine hip and pelvis; Future    Benign colon polyp    Essential hypertension  -     CBC and differential; Future    Mixed hyperlipidemia  -     Comprehensive metabolic panel; Future  -     Lipid panel; Future  -     TSH, 3rd generation; Future    Need for hepatitis C screening test  -     Hepatitis C antibody; Future    Other orders  -     calcium-vitamin D 250-100 MG-UNIT per tablet;  Take 1 tablet by mouth 2 (two) times a day Subjective:      Patient ID: Erika Treviño is a 60 y o  female    Patient presents today for six-month follow-up  She is planning her move to [place] with her sister at the end of October, which has her stress level elevated  Overall she is feeling well  She has no complaints at this time  She states that she has not been exercising recently  She has received her flu vaccine  She is requesting a script for a DEXA scan         Not exercising as much b/c of upcoming move    Weaned to 75mg effexor not sure if she should go lower            Current Outpatient Prescriptions:     calcium-vitamin D 250-100 MG-UNIT per tablet, Take 1 tablet by mouth 2 (two) times a day, Disp: , Rfl:     cycloSPORINE (RESTASIS) 0 05 % ophthalmic emulsion, Apply 1 drop to eye 2 (two) times a day, Disp: , Rfl:     venlafaxine (EFFEXOR-XR) 75 mg 24 hr capsule, Take 1 capsule (75 mg total) by mouth daily, Disp: 30 capsule, Rfl: 0    cetirizine (ZyrTEC) 10 mg tablet, Take 1 tablet by mouth daily (Patient not taking: Reported on 9/20/2018 ), Disp: 30 tablet, Rfl: 0    Cholecalciferol (VITAMIN D) 2000 units CAPS, Take by mouth, Disp: , Rfl:     Recent Results (from the past 1008 hour(s))   Lipid panel    Collection Time: 09/13/18 11:08 AM   Result Value Ref Range    Total Cholesterol 241 (H) <200 mg/dL    HDL 77 >50 mg/dL    Triglycerides 89 <150 mg/dL    LDL Direct 145 (H) mg/dL (calc)    Chol HDLC Ratio 3 1 <5 0 (calc)    Non-HDL Cholesterol 164 (H) <130 mg/dL (calc)   Comprehensive metabolic panel    Collection Time: 09/13/18 11:08 AM   Result Value Ref Range    Glucose 91 65 - 99 mg/dL    BUN 21 7 - 25 mg/dL    Creatinine 0 87 0 50 - 0 99 mg/dL    eGFR Non  71 > OR = 60 mL/min/1 73m2    SL AMB EGFR  82 > OR = 60 mL/min/1 73m2    SL AMB BUN/CREATININE RATIO NOT APPLICABLE 6 - 22 (calc)    Sodium 141 135 - 146 mmol/L    SL AMB POTASSIUM 4 7 3 5 - 5 3 mmol/L    Chloride 105 98 - 110 mmol/L CO2 31 20 - 32 mmol/L    SL AMB CALCIUM 9 6 8 6 - 10 4 mg/dL    SL AMB PROTEIN, TOTAL 7 0 6 1 - 8 1 g/dL    Albumin 4 4 3 6 - 5 1 g/dL    Globulin 2 6 1 9 - 3 7 g/dL (calc)    SL AMB ALBUMIN/GLOBULIN RATIO 1 7 1 0 - 2 5 (calc)    TOTAL BILIRUBIN 0 5 0 2 - 1 2 mg/dL    Alkaline Phosphatase 83 33 - 130 U/L    SL AMB AST 16 10 - 35 U/L    SL AMB ALT 13 6 - 29 U/L   CBC and differential    Collection Time: 09/13/18 11:08 AM   Result Value Ref Range    White Blood Cell Count 4 1 3 8 - 10 8 Thousand/uL    Red Blood Cell Count 4 47 3 80 - 5 10 Million/uL    Hemoglobin 14 2 11 7 - 15 5 g/dL    HCT 42 3 35 0 - 45 0 %    MCV 94 6 80 0 - 100 0 fL    MCH 31 8 27 0 - 33 0 pg    MCHC 33 6 32 0 - 36 0 g/dL    RDW 12 9 11 0 - 15 0 %    Platelet Count 399 249 - 400 Thousand/uL    SL AMB MPV 10 2 7 5 - 12 5 fL    Neutrophils (Absolute) 2,230 1,500 - 7,800 cells/uL    Lymphocytes (Absolute) 1,415 850 - 3,900 cells/uL    Monocytes (Absolute) 357 200 - 950 cells/uL    Eosinophils (Absolute) 57 15 - 500 cells/uL    Basophils (Absolute) 41 0 - 200 cells/uL    Neutrophils 54 4 %    Lymphocytes 34 5 %    Monocytes 8 7 %    Eosinophils 1 4 %    Basophils Relative 1 0 %   Vitamin D 25 hydroxy    Collection Time: 09/13/18 11:08 AM   Result Value Ref Range    Vitamin D, 25-Hydroxy, Serum 42 30 - 100 ng/mL       The following portions of the patient's history were reviewed and updated as appropriate: allergies, current medications, past family history, past medical history, past social history, past surgical history and problem list      Review of Systems   Constitutional: Negative  HENT: Negative  Eyes: Negative  Respiratory: Negative for cough, chest tightness, shortness of breath and wheezing  Cardiovascular: Negative for chest pain and palpitations  Gastrointestinal: Negative  Endocrine: Negative  Genitourinary: Negative  Musculoskeletal: Negative  Skin: Negative  Allergic/Immunologic: Negative      Neurological: Negative  Hematological: Negative  Psychiatric/Behavioral: Negative  Objective:      /100 (BP Location: Left arm, Patient Position: Sitting)   Pulse 89   Resp 14   Ht 5' 1" (1 549 m)   Wt 64 3 kg (141 lb 12 8 oz)   BMI 26 79 kg/m²        Physical Exam   Constitutional: She is oriented to person, place, and time  She appears well-developed and well-nourished  HENT:   Head: Normocephalic and atraumatic  Eyes: Conjunctivae are normal    Neck: Normal range of motion  Neck supple  Cardiovascular: Regular rhythm, normal heart sounds and intact distal pulses  Pulmonary/Chest: Effort normal and breath sounds normal  No respiratory distress  She has no wheezes  She has no rales  Abdominal: Soft  She exhibits no distension  Musculoskeletal: Normal range of motion  She exhibits no edema or deformity  Lymphadenopathy:     She has no cervical adenopathy  Neurological: She is alert and oriented to person, place, and time  Skin: Skin is warm and dry  Psychiatric: She has a normal mood and affect   Her behavior is normal  Judgment and thought content normal

## 2018-09-20 NOTE — PATIENT INSTRUCTIONS
Anxiety:  Patient to continue on 75 mg of Effexor XR daily  Her anxiety is well controlled on this dosage  She may consider in the future decreasing this dose but would like to wait until after the holidays  Essential hypertension:   Recheck blood pressure is 140/100  Patient states that she does tend to have higher blood pressure in the doctor's office  She has a home blood pressure monitor and will monitor her blood pressure over the next few weeks  She will contact the office with her results  Discussion regarding increasing exercise and decreasing sodium in her diet  Hyperlipidemia:  Labs reviewed in detail  Lengthy discussion regarding arthrosclerosis risks  Patient prefers at this time to control cholesterol with diet and exercise and does not want to start statins, which is not absolutely indicated at this time  Will recheck labs in 6 months  Health maintenance:  Patient is due for colonoscopy next October 2019  Patient is up-to-date on mammogram and Pap smear  DEXA scan ordered  Hepatitis C screening ordered  Patient has already received flu vaccine for this season      Pt moving to [place] but will still continue to follow with us

## 2018-11-09 DIAGNOSIS — F41.9 ANXIETY: ICD-10-CM

## 2018-11-09 RX ORDER — VENLAFAXINE HYDROCHLORIDE 75 MG/1
75 CAPSULE, EXTENDED RELEASE ORAL DAILY
Qty: 30 CAPSULE | Refills: 3 | OUTPATIENT
Start: 2018-11-09

## 2018-11-12 ENCOUNTER — TELEPHONE (OUTPATIENT)
Dept: INTERNAL MEDICINE CLINIC | Facility: CLINIC | Age: 63
End: 2018-11-12

## 2018-11-12 NOTE — TELEPHONE ENCOUNTER
Patient will be coming here today to  her lab orders and Mammo and Dexa orders    She moved and can't find them  Karrie Nissen Karrie Nissen Please have ready for her    What ever things Nicole ordered for her to get done

## 2018-11-14 LAB
ALBUMIN SERPL-MCNC: 4.3 G/DL (ref 3.6–5.1)
ALBUMIN/GLOB SERPL: 1.7 (CALC) (ref 1–2.5)
ALP SERPL-CCNC: 82 U/L (ref 33–130)
ALT SERPL-CCNC: 14 U/L (ref 6–29)
AST SERPL-CCNC: 18 U/L (ref 10–35)
BASOPHILS # BLD AUTO: 32 CELLS/UL (ref 0–200)
BASOPHILS NFR BLD AUTO: 0.6 %
BILIRUB SERPL-MCNC: 0.4 MG/DL (ref 0.2–1.2)
BUN SERPL-MCNC: 21 MG/DL (ref 7–25)
BUN/CREAT SERPL: NORMAL (CALC) (ref 6–22)
CALCIUM SERPL-MCNC: 9.4 MG/DL (ref 8.6–10.4)
CHLORIDE SERPL-SCNC: 107 MMOL/L (ref 98–110)
CHOLEST SERPL-MCNC: 243 MG/DL
CHOLEST/HDLC SERPL: 3.3 (CALC)
CO2 SERPL-SCNC: 29 MMOL/L (ref 20–32)
CREAT SERPL-MCNC: 0.69 MG/DL (ref 0.5–0.99)
EOSINOPHIL # BLD AUTO: 81 CELLS/UL (ref 15–500)
EOSINOPHIL NFR BLD AUTO: 1.5 %
ERYTHROCYTE [DISTWIDTH] IN BLOOD BY AUTOMATED COUNT: 12.2 % (ref 11–15)
GLOBULIN SER CALC-MCNC: 2.6 G/DL (CALC) (ref 1.9–3.7)
GLUCOSE SERPL-MCNC: 91 MG/DL (ref 65–99)
HCT VFR BLD AUTO: 43 % (ref 35–45)
HCV AB S/CO SERPL IA: 0
HCV AB SERPL QL IA: NORMAL
HDLC SERPL-MCNC: 74 MG/DL
HGB BLD-MCNC: 14.4 G/DL (ref 11.7–15.5)
LDLC SERPL CALC-MCNC: 146 MG/DL (CALC)
LYMPHOCYTES # BLD AUTO: 1350 CELLS/UL (ref 850–3900)
LYMPHOCYTES NFR BLD AUTO: 25 %
MCH RBC QN AUTO: 31.3 PG (ref 27–33)
MCHC RBC AUTO-ENTMCNC: 33.5 G/DL (ref 32–36)
MCV RBC AUTO: 93.5 FL (ref 80–100)
MONOCYTES # BLD AUTO: 410 CELLS/UL (ref 200–950)
MONOCYTES NFR BLD AUTO: 7.6 %
NEUTROPHILS # BLD AUTO: 3526 CELLS/UL (ref 1500–7800)
NEUTROPHILS NFR BLD AUTO: 65.3 %
NONHDLC SERPL-MCNC: 169 MG/DL (CALC)
PLATELET # BLD AUTO: 330 THOUSAND/UL (ref 140–400)
PMV BLD REES-ECKER: 10.4 FL (ref 7.5–12.5)
POTASSIUM SERPL-SCNC: 4.6 MMOL/L (ref 3.5–5.3)
PROT SERPL-MCNC: 6.9 G/DL (ref 6.1–8.1)
RBC # BLD AUTO: 4.6 MILLION/UL (ref 3.8–5.1)
SL AMB EGFR AFRICAN AMERICAN: 107 ML/MIN/1.73M2
SL AMB EGFR NON AFRICAN AMERICAN: 92 ML/MIN/1.73M2
SODIUM SERPL-SCNC: 142 MMOL/L (ref 135–146)
TRIGL SERPL-MCNC: 117 MG/DL
TSH SERPL-ACNC: 2.49 MIU/L (ref 0.4–4.5)
WBC # BLD AUTO: 5.4 THOUSAND/UL (ref 3.8–10.8)

## 2018-11-15 ENCOUNTER — TELEPHONE (OUTPATIENT)
Dept: INTERNAL MEDICINE CLINIC | Facility: CLINIC | Age: 63
End: 2018-11-15

## 2018-11-15 DIAGNOSIS — E78.5 HYPERLIPIDEMIA, UNSPECIFIED HYPERLIPIDEMIA TYPE: Primary | ICD-10-CM

## 2018-11-15 DIAGNOSIS — R73.01 IMPAIRED FASTING BLOOD SUGAR: ICD-10-CM

## 2018-11-15 NOTE — TELEPHONE ENCOUNTER
CALLED PT  AND WAS NOTIFIED AND ASKED FOR NEW LABS TO BE MAILED TO HER AT [address] [place] [address]

## 2018-11-15 NOTE — TELEPHONE ENCOUNTER
Rosalva Robins her labs are uncharged, I will order new ones for mid march ,DirectAddress_Unknown,DirectAddress_Unknown

## 2018-11-15 NOTE — TELEPHONE ENCOUNTER
Pt mistakenly thought she had a Nov appt scheduled with you, hence the reason for having the labs drawn    Appt is for 3/21/19

## 2018-11-15 NOTE — PROGRESS NOTES
So im a little confused, she did labs but she wasn't due for them until feb/march - was there a reason she did them early

## 2018-11-15 NOTE — TELEPHONE ENCOUNTER
----- Message from Aj Gave, [address] sent at 11/15/2018 12:01 PM EST -----  So im a little confused, she did labs but she wasn't due for them until feb/march - was there a reason she did them early

## 2018-11-27 DIAGNOSIS — F41.9 ANXIETY: ICD-10-CM

## 2018-11-27 RX ORDER — VENLAFAXINE HYDROCHLORIDE 75 MG/1
75 CAPSULE, EXTENDED RELEASE ORAL DAILY
Qty: 30 CAPSULE | Refills: 0 | Status: SHIPPED | OUTPATIENT
Start: 2018-11-27 | End: 2018-12-27 | Stop reason: SDUPTHER

## 2018-11-30 DIAGNOSIS — Z12.31 ENCOUNTER FOR SCREENING MAMMOGRAM FOR MALIGNANT NEOPLASM OF BREAST: ICD-10-CM

## 2018-11-30 DIAGNOSIS — R92.2 INCONCLUSIVE MAMMOGRAM: ICD-10-CM

## 2018-12-20 ENCOUNTER — TELEPHONE (OUTPATIENT)
Dept: INTERNAL MEDICINE CLINIC | Facility: CLINIC | Age: 63
End: 2018-12-20

## 2018-12-20 NOTE — TELEPHONE ENCOUNTER
----- Message from Magdalene Herdeia, [address] sent at 12/20/2018  8:41 AM EST -----  BONE DENSITY SHOWS MINIMAL WEAKNESS - NO MEDICATION NEEDED AT THIS TIME JUST CONTINUE CALCIUM AND VIT D

## 2018-12-20 NOTE — PROGRESS NOTES
BONE DENSITY SHOWS MINIMAL WEAKNESS - NO MEDICATION NEEDED AT THIS TIME JUST CONTINUE CALCIUM AND VIT D

## 2018-12-27 DIAGNOSIS — F41.9 ANXIETY: ICD-10-CM

## 2018-12-27 RX ORDER — VENLAFAXINE HYDROCHLORIDE 75 MG/1
75 CAPSULE, EXTENDED RELEASE ORAL DAILY
Qty: 30 CAPSULE | Refills: 0 | Status: SHIPPED | OUTPATIENT
Start: 2018-12-27 | End: 2019-02-01 | Stop reason: SDUPTHER

## 2019-02-01 DIAGNOSIS — F41.9 ANXIETY: ICD-10-CM

## 2019-02-01 RX ORDER — VENLAFAXINE HYDROCHLORIDE 75 MG/1
75 CAPSULE, EXTENDED RELEASE ORAL DAILY
Qty: 90 CAPSULE | Refills: 1 | Status: SHIPPED | OUTPATIENT
Start: 2019-02-01 | End: 2019-03-03

## 2019-02-01 NOTE — TELEPHONE ENCOUNTER
NEEDS  VENLAFAXINE 75MG  [facility]    [phone]  PLEASE NOT PATIENT HAS A NEW PHARMACY

## 2019-03-18 ENCOUNTER — TELEPHONE (OUTPATIENT)
Dept: INTERNAL MEDICINE CLINIC | Facility: CLINIC | Age: 64
End: 2019-03-18

## 2019-07-14 DIAGNOSIS — F41.9 ANXIETY: ICD-10-CM

## 2019-07-15 RX ORDER — VENLAFAXINE HYDROCHLORIDE 75 MG/1
75 CAPSULE, EXTENDED RELEASE ORAL DAILY
Qty: 90 CAPSULE | Refills: 1 | OUTPATIENT
Start: 2019-07-15 | End: 2019-08-14